# Patient Record
Sex: FEMALE | Race: WHITE
[De-identification: names, ages, dates, MRNs, and addresses within clinical notes are randomized per-mention and may not be internally consistent; named-entity substitution may affect disease eponyms.]

---

## 2019-02-21 ENCOUNTER — HOSPITAL ENCOUNTER (OUTPATIENT)
Dept: HOSPITAL 92 - BICMAMMO | Age: 54
Discharge: HOME | End: 2019-02-21
Attending: NURSE PRACTITIONER
Payer: COMMERCIAL

## 2019-02-21 DIAGNOSIS — Z80.3: ICD-10-CM

## 2019-02-21 DIAGNOSIS — Z12.31: Primary | ICD-10-CM

## 2019-02-21 PROCEDURE — 77067 SCR MAMMO BI INCL CAD: CPT

## 2019-02-21 PROCEDURE — 77063 BREAST TOMOSYNTHESIS BI: CPT

## 2020-12-11 ENCOUNTER — HOSPITAL ENCOUNTER (OUTPATIENT)
Dept: HOSPITAL 92 - BICMAMMO | Age: 55
Discharge: HOME | End: 2020-12-11
Attending: NURSE PRACTITIONER
Payer: COMMERCIAL

## 2020-12-11 DIAGNOSIS — Z80.3: ICD-10-CM

## 2020-12-11 DIAGNOSIS — Z12.31: Primary | ICD-10-CM

## 2020-12-11 PROCEDURE — 77067 SCR MAMMO BI INCL CAD: CPT

## 2020-12-11 PROCEDURE — 77080 DXA BONE DENSITY AXIAL: CPT

## 2020-12-11 PROCEDURE — 77063 BREAST TOMOSYNTHESIS BI: CPT

## 2020-12-11 NOTE — MMO
Bilateral MAMMO Bilat Screen [facility].

 

CLINICAL HISTORY:

Patient is 55 years old and is seen for screening. The patient has the following

family history of breast cancer:  maternal grandmother, malignant (generic).

The patient has no personal history of cancer.

 

VIEWS:

The views performed were:  bilateral craniocaudal with tomosynthesis and

bilateral mediolateral oblique with tomosynthesis.

 

FILMS COMPARED:

The present examination has been compared to prior imaging studies performed at

[facility] on 11/23/2016, 11/17/2017 and 02/21/2019, and at [facility] on 02/21/2016.

 

This study has been interpreted with the assistance of computer-aided detection.

 

MAMMOGRAM FINDINGS:

There are scattered fibroglandular densities.

 

There are no suspicious masses, suspicious calcifications, or new areas of

architectural distortion.

 

IMPRESSION:

THERE IS NO MAMMOGRAPHIC EVIDENCE OF MALIGNANCY.

 

A ROUTINE FOLLOW-UP MAMMOGRAM IN 1 YEAR IS RECOMMENDED.

 

THE RESULTS OF THIS EXAM WERE SENT TO THE PATIENT.

 

ACR BI-RADS Category 1 - Negative

 

MAMMOGRAPHY NOTE:

 1. A negative mammogram report should not delay a biopsy if a dominant of

 clinically suspicious mass is present.

 2. Approximately 10% to 15% of breast cancers are not detected by

 mammography.

 3. Adenosis and dense breasts may obscure an underlying neoplasm.

 

 

Reported by: ABILIO STEPHENSON MD

Electonically Signed: [id number]

## 2020-12-11 NOTE — BD
EXAM: Bone densitometry using DEXA



HISTORY:

54 yo female. Screening for postmenopausal osteoporosis



FINDINGS:

L1--bone mineral density 0.996 g/sq cm; T score 0.1 ; Z score 0.9



L2--bone mineral density 0.975 g/sq cm; T score -0.5 ; Z score 0.5



L3--bone mineral density 1.001 g/sq cm; T score -0.8 ; Z score 0.3



L4--bone mineral density 1.212 g/sq cm; T score 1.4 ; Z score 2.4



Total L1-L4--bone mineral density 1.055 g/sq cm; T score 0.1 ; Z score 1.1



Left femoral neck--bone mineral density0.753; T score -0.9 ; Z score 0.1



Total proximal left femur--bone mineral density 1.002; T score 0.5 ; Z score 1.1



IMPRESSION: Normal BMD 



Reported By: Duran Cota 

Electronically Signed:  12/11/2020 9:46 AM